# Patient Record
Sex: FEMALE | Race: WHITE | NOT HISPANIC OR LATINO | Employment: FULL TIME | URBAN - METROPOLITAN AREA
[De-identification: names, ages, dates, MRNs, and addresses within clinical notes are randomized per-mention and may not be internally consistent; named-entity substitution may affect disease eponyms.]

---

## 2017-10-10 ENCOUNTER — TELEPHONE (OUTPATIENT)
Dept: NEUROLOGY | Facility: CLINIC | Age: 42
End: 2017-10-10

## 2017-10-10 NOTE — TELEPHONE ENCOUNTER
----- Message from Sheryl Calhoun sent at 10/10/2017  3:06 PM CDT -----  Contact: pt  Pt states a referral was supposed to have been sent over for her to make an appointment and she wants to make sure received,NP from [facility].....[phone]

## 2017-10-16 ENCOUNTER — TELEPHONE (OUTPATIENT)
Dept: NEUROLOGY | Facility: CLINIC | Age: 42
End: 2017-10-16

## 2017-10-16 NOTE — TELEPHONE ENCOUNTER
----- Message from Fely Herrera sent at 10/16/2017  4:14 PM CDT -----  Daughter/Asmita dropped off patient referral and was wanting to know when patient would be scheduled. Pleases call to schedule at [phone].

## 2017-10-17 NOTE — TELEPHONE ENCOUNTER
RJ, this isn't for me, it's for you.  I have no idea what the referral is or what the person needs to be seen for.

## 2017-11-03 ENCOUNTER — TELEPHONE (OUTPATIENT)
Dept: NEUROLOGY | Facility: CLINIC | Age: 42
End: 2017-11-03

## 2017-11-03 NOTE — TELEPHONE ENCOUNTER
Pt referred to Brie Campos NP for MS. Brie does not treat MS. Faxed referral to Vicky Pandey MD to schedule.

## 2017-11-09 ENCOUNTER — DOCUMENTATION ONLY (OUTPATIENT)
Dept: NEUROLOGY | Facility: CLINIC | Age: 42
End: 2017-11-09

## 2017-11-09 NOTE — PROGRESS NOTES
Forwarded medical records from [facility] to Brie Campos's office at fax# [phone].  IM Lisa Costa to inform her that records were being sent.

## 2018-02-21 DIAGNOSIS — R93.89 ABNORMAL MRI: Primary | ICD-10-CM

## 2018-02-22 NOTE — PROGRESS NOTES
CBC (INCLUDES DIFF/PLT) Final    No Documents Attached     Test  Result  Flag Reference Goal    WBC 5.4 x10E3/uL  3.4-10.8 New    RBC 4.77 x10E6/uL  3.77-5.28 New    Hemoglobin 13.6 g/dL  11.1-15.9 New    **Effective December 4, 2017 the reference interval**                   for Hemoglobin MALES only will be changing to:                                         Males 13-15 years: 12.6 - 17.7                                         Males   >15 years: 13.0 - 17.7     Hematocrit 41.0 %  34.0-46.6 New    MCV 86 fL  79-97 New    MCH 28.5 pg  26.6-33.0 New    MCHC 33.2 g/dL  31.5-35.7 New    RDW 13.8 %  12.3-15.4 New    Platelets 274 x10E3/uL  150-379 New    Neutrophils 53 %  Not Estab. New    Lymphs 35 %  Not Estab. New    Monocytes 8 %  Not Estab. New    Eos 3 %  Not Estab. New    Basos 1 %  Not Estab. New    Immature Granulocytes 0 %  Not Estab. New    Neutrophils (Absolute) 2.9 x10E3/uL  1.4-7.0 New    Lymphs (Absolute) 1.9 x10E3/uL  0.7-3.1 New    Monocytes(Absolute) 0.5 x10E3/uL  0.1-0.9 New    Eos (Absolute) 0.1 x10E3/uL  0.0-0.4 New    Baso (Absolute) 0.0 x10E3/uL  0.0-0.2 New    NRBC    New    Hematology Comments:    New    Immature Cells    New    Immature Grans (Abs) 0.0 x10E3/uL  0.0-0.1 New     Ordered by: CELINE JOHNSON Collected/Examined: 21Nov2017 11:07AM   Verified by: CELINE JOHNSON 07Dec2017 06:49PM   Result Communication: No patient communication needed at this time;  Stage: Final   Performed at: [facility] Resulted: 22Nov2017 05:42AM Last Updated: 07Dec2017 06:49PM Accession: [id number]

## 2018-02-26 DIAGNOSIS — R94.02 ABNORMAL BRAIN SCAN: Primary | ICD-10-CM

## 2018-03-07 ENCOUNTER — ANESTHESIA EVENT (OUTPATIENT)
Dept: SURGERY | Facility: HOSPITAL | Age: 43
End: 2018-03-07

## 2018-03-08 ENCOUNTER — HOSPITAL ENCOUNTER (OUTPATIENT)
Facility: HOSPITAL | Age: 43
Discharge: HOME | End: 2018-03-08
Attending: PSYCHIATRY & NEUROLOGY | Admitting: PSYCHIATRY & NEUROLOGY
Payer: MEDICAID

## 2018-03-08 VITALS
BODY MASS INDEX: 23.05 KG/M2 | WEIGHT: 135 LBS | RESPIRATION RATE: 17 BRPM | HEART RATE: 75 BPM | OXYGEN SATURATION: 97 % | DIASTOLIC BLOOD PRESSURE: 60 MMHG | TEMPERATURE: 99 F | SYSTOLIC BLOOD PRESSURE: 102 MMHG | HEIGHT: 64 IN

## 2018-03-08 DIAGNOSIS — R94.02 ABNORMAL BRAIN SCAN: ICD-10-CM

## 2018-03-08 DIAGNOSIS — G97.1: ICD-10-CM

## 2018-03-08 LAB
CLARITY CSF: CLEAR
COLOR CSF: COLORLESS
GLUCOSE CSF-MCNC: 49 MG/DL
PROT CSF-MCNC: 30 MG/DL
RBC # CSF: 0 /CU MM
SPECIMEN VOL CSF: 8 ML
WBC # CSF: 1 /CU MM

## 2018-03-08 PROCEDURE — 84157 ASSAY OF PROTEIN OTHER: CPT

## 2018-03-08 PROCEDURE — 82040 ASSAY OF SERUM ALBUMIN: CPT

## 2018-03-08 PROCEDURE — 82945 GLUCOSE OTHER FLUID: CPT

## 2018-03-08 PROCEDURE — 82164 ANGIOTENSIN I ENZYME TEST: CPT

## 2018-03-08 PROCEDURE — 89051 BODY FLUID CELL COUNT: CPT

## 2018-03-08 NOTE — H&P
Radiology History & Physical      SUBJECTIVE:     Chief Complaint: Concern for Neurologic Process       History of Present Illness:  Capri Khanna is a 42 y.o. female who presents for LP    History reviewed. No pertinent past medical history.  Past Surgical History:   Procedure Laterality Date    BREAST SURGERY Bilateral     age 21       Home Meds:   Prior to Admission medications    Medication Sig Start Date End Date Taking? Authorizing Provider   BUPROPION HCL (WELLBUTRIN ORAL) Take 10 mg by mouth once daily.   Yes Historical Provider, MD     Anticoagulants/Antiplatelets: no anticoagulation    Allergies: Review of patient's allergies indicates:  No Known Allergies  Sedation History:  no adverse reactions             OBJECTIVE:     Vital Signs (Most Recent)  Temp: 98.7 °F (37.1 °C) (03/08/18 1337)  Pulse: (!) 55 (03/08/18 1337)  Resp: 16 (03/08/18 1337)  BP: (!) 91/55 (03/08/18 1337)  SpO2: 97 % (03/08/18 1337)    Physical Exam:  ASA: 3  Mallampati: 2    General: no acute distress  Mental Status: alert and oriented to person, place and time  HEENT: normocephalic, atraumatic  Chest: unlabored breathing  Heart: regular heart rate  Abdomen: nondistended  Extremity: moves all extremities    Laboratory  No results found for: INR  No results found for: WBC, HGB, HCT, MCV, PLT No results found for: GLU, NA, K, CL, CO2, BUN, CREATININE, CALCIUM, MG, ALT, AST, ALBUMIN, BILITOT, BILIDIR    ASSESSMENT/PLAN:     Sedation Plan: Local  Patient will undergo Lumbar Puncture.    .IMamadou M.D. personally reviewed and agree with the above dictated note.

## 2018-03-08 NOTE — DISCHARGE INSTRUCTIONS
How will I care for myself at home?   Activity    You may start taking tub baths and showers again 24 hours after your procedure.    You need to rest in bed at home for 24 hours with your head elevated on at least one pillow, except to go to the bathroom or to eat meals.    For 24 hours after your procedure:   o Avoid strenuous physical activity and bending at the waist   o Try not to cough, sneeze, strain or make quick movements     Diet    You can eat your normal diet. If your stomach is upset, try bland, low-fat foods like plain rice, toast and yogurt    Drink extra fluids, at least 1 cup of fluid every other hour until bedtime.    Medications  You can take your normal prescription medicines as your doctor has instructed you to do.    If you are not taking a prescription pain medicine, ask your doctor if you can take an over-the-counter medicine for your pain.     Special Instructions    You can remove the band aid from your procedure site 24 hours after your procedure. If the band aid is soiled you may replace it.    You will receive follow up care from your doctor who ordered your procedure.     When should I call for help?    Call 911 anytime if you have any of the following signs and symptoms:   - shortness of breath   - chest pains   - Loss of consciousness - (if you passed out or fainted)     Notify physician for the following...  - Fever (greater than 100.6°F)   - Continuous vomiting   - Severe headache or stiff neck   - Redness, swelling, red streaking or pain at IV site or procedure site

## 2018-03-08 NOTE — PROCEDURES
Radiology Post Procedure Note:     Procedure: Lumbar Puncture    (s): Terrell    Blood Loss: Minimal    Specimen: 9 cc clear CSF    Findings:     Under imaging guidance, the Spinal Canal was accessed w a 22 G needle and 9 cc clear CSF drained into 4 separate containers.     IMamadou M.D. personally reviewed and agree with the above dictated note.

## 2018-03-09 NOTE — DISCHARGE SUMMARY
Radiology Discharge Summary      Hospital Course: No complications    Admit Date: 3/8/2018  Discharge Date: 03/09/2018     Instructions Given to Patient: Yes  Diet: Resume prior diet  Activity: activity as tolerated    Description of Condition on Discharge: Stable  Vital Signs (Most Recent): Temp: 98.6 °F (37 °C) (03/08/18 1754)  Pulse: 75 (03/08/18 1754)  Resp: 17 (03/08/18 1754)  BP: 102/60 (03/08/18 1754)  SpO2: 97 % (03/08/18 1754)    Discharge Disposition: Home    Discharge Diagnosis: post LP     Follow-up: with neurology    .IMamadou M.D. personally reviewed and agree with the above dictated note.

## 2018-03-12 LAB
ALBUMIN CSF-MCNC: 17.3 MG/DL
ALBUMIN SERPL-MCNC: 4080 MG/DL
IGG CSF-MCNC: 1.3 MG/DL
IGG SERPL-MCNC: 795 MG/DL
IGG SYNTH RATE SER+CSF CALC-MRATE: 0 MG/24 H
IGG/ALB CLEAR SER+CSF-RTO: 0.42
IGG/ALB CSF: 0.08 {RATIO}
IGG/ALB SER: 0.19 {RATIO}
OLIGOCLONAL BANDS CSF ELPH-IMP: 1 BANDS
OLIGOCLONAL BANDS CSF ELPH-IMP: 1 BANDS
OLIGOCLONAL BANDS SERPL: 0 BANDS

## 2018-03-14 LAB — ACE CSF-CCNC: 1.8 U/L (ref 0–2.5)
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
